# Patient Record
Sex: MALE | Race: WHITE | NOT HISPANIC OR LATINO | Employment: FULL TIME | URBAN - METROPOLITAN AREA
[De-identification: names, ages, dates, MRNs, and addresses within clinical notes are randomized per-mention and may not be internally consistent; named-entity substitution may affect disease eponyms.]

---

## 2023-06-28 ENCOUNTER — HOSPITAL ENCOUNTER (EMERGENCY)
Facility: MEDICAL CENTER | Age: 38
End: 2023-06-28
Attending: STUDENT IN AN ORGANIZED HEALTH CARE EDUCATION/TRAINING PROGRAM
Payer: COMMERCIAL

## 2023-06-28 ENCOUNTER — APPOINTMENT (OUTPATIENT)
Dept: RADIOLOGY | Facility: MEDICAL CENTER | Age: 38
End: 2023-06-28
Attending: STUDENT IN AN ORGANIZED HEALTH CARE EDUCATION/TRAINING PROGRAM
Payer: COMMERCIAL

## 2023-06-28 VITALS
HEART RATE: 75 BPM | BODY MASS INDEX: 20.14 KG/M2 | OXYGEN SATURATION: 96 % | TEMPERATURE: 98 F | RESPIRATION RATE: 20 BRPM | HEIGHT: 74 IN | DIASTOLIC BLOOD PRESSURE: 83 MMHG | SYSTOLIC BLOOD PRESSURE: 127 MMHG | WEIGHT: 156.97 LBS

## 2023-06-28 DIAGNOSIS — I26.94 MULTIPLE SUBSEGMENTAL PULMONARY EMBOLI WITHOUT ACUTE COR PULMONALE (HCC): ICD-10-CM

## 2023-06-28 LAB
ALBUMIN SERPL BCP-MCNC: 4 G/DL (ref 3.2–4.9)
ALBUMIN/GLOB SERPL: 1.3 G/DL
ALP SERPL-CCNC: 55 U/L (ref 30–99)
ALT SERPL-CCNC: 10 U/L (ref 2–50)
ANION GAP SERPL CALC-SCNC: 13 MMOL/L (ref 7–16)
APTT PPP: 29.4 SEC (ref 24.7–36)
AST SERPL-CCNC: 11 U/L (ref 12–45)
BASOPHILS # BLD AUTO: 0.4 % (ref 0–1.8)
BASOPHILS # BLD: 0.03 K/UL (ref 0–0.12)
BILIRUB SERPL-MCNC: 1.2 MG/DL (ref 0.1–1.5)
BUN SERPL-MCNC: 14 MG/DL (ref 8–22)
CALCIUM ALBUM COR SERPL-MCNC: 8.9 MG/DL (ref 8.5–10.5)
CALCIUM SERPL-MCNC: 8.9 MG/DL (ref 8.4–10.2)
CHLORIDE SERPL-SCNC: 105 MMOL/L (ref 96–112)
CO2 SERPL-SCNC: 22 MMOL/L (ref 20–33)
CREAT SERPL-MCNC: 0.95 MG/DL (ref 0.5–1.4)
D DIMER PPP IA.FEU-MCNC: 6.94 UG/ML (FEU) (ref 0–0.5)
EKG IMPRESSION: NORMAL
EOSINOPHIL # BLD AUTO: 0.3 K/UL (ref 0–0.51)
EOSINOPHIL NFR BLD: 4.1 % (ref 0–6.9)
ERYTHROCYTE [DISTWIDTH] IN BLOOD BY AUTOMATED COUNT: 40 FL (ref 35.9–50)
GFR SERPLBLD CREATININE-BSD FMLA CKD-EPI: 106 ML/MIN/1.73 M 2
GLOBULIN SER CALC-MCNC: 3.1 G/DL (ref 1.9–3.5)
GLUCOSE SERPL-MCNC: 103 MG/DL (ref 65–99)
HCT VFR BLD AUTO: 44.2 % (ref 42–52)
HGB BLD-MCNC: 15 G/DL (ref 14–18)
IMM GRANULOCYTES # BLD AUTO: 0.03 K/UL (ref 0–0.11)
IMM GRANULOCYTES NFR BLD AUTO: 0.4 % (ref 0–0.9)
INR PPP: 1.11 (ref 0.87–1.13)
LYMPHOCYTES # BLD AUTO: 1.5 K/UL (ref 1–4.8)
LYMPHOCYTES NFR BLD: 20.7 % (ref 22–41)
MCH RBC QN AUTO: 29.8 PG (ref 27–33)
MCHC RBC AUTO-ENTMCNC: 33.9 G/DL (ref 32.3–36.5)
MCV RBC AUTO: 87.7 FL (ref 81.4–97.8)
MONOCYTES # BLD AUTO: 0.63 K/UL (ref 0–0.85)
MONOCYTES NFR BLD AUTO: 8.7 % (ref 0–13.4)
NEUTROPHILS # BLD AUTO: 4.77 K/UL (ref 1.82–7.42)
NEUTROPHILS NFR BLD: 65.7 % (ref 44–72)
NRBC # BLD AUTO: 0 K/UL
NRBC BLD-RTO: 0 /100 WBC (ref 0–0.2)
NT-PROBNP SERPL IA-MCNC: 52 PG/ML (ref 0–125)
PLATELET # BLD AUTO: 184 K/UL (ref 164–446)
PMV BLD AUTO: 9.4 FL (ref 9–12.9)
POTASSIUM SERPL-SCNC: 3.8 MMOL/L (ref 3.6–5.5)
PROT SERPL-MCNC: 7.1 G/DL (ref 6–8.2)
PROTHROMBIN TIME: 14.2 SEC (ref 12–14.6)
RBC # BLD AUTO: 5.04 M/UL (ref 4.7–6.1)
SODIUM SERPL-SCNC: 140 MMOL/L (ref 135–145)
TROPONIN T SERPL-MCNC: <6 NG/L (ref 6–19)
WBC # BLD AUTO: 7.3 K/UL (ref 4.8–10.8)

## 2023-06-28 PROCEDURE — 99285 EMERGENCY DEPT VISIT HI MDM: CPT

## 2023-06-28 PROCEDURE — 85025 COMPLETE CBC W/AUTO DIFF WBC: CPT

## 2023-06-28 PROCEDURE — 71275 CT ANGIOGRAPHY CHEST: CPT

## 2023-06-28 PROCEDURE — 700117 HCHG RX CONTRAST REV CODE 255: Performed by: STUDENT IN AN ORGANIZED HEALTH CARE EDUCATION/TRAINING PROGRAM

## 2023-06-28 PROCEDURE — 85610 PROTHROMBIN TIME: CPT

## 2023-06-28 PROCEDURE — 80053 COMPREHEN METABOLIC PANEL: CPT

## 2023-06-28 PROCEDURE — 700102 HCHG RX REV CODE 250 W/ 637 OVERRIDE(OP): Performed by: STUDENT IN AN ORGANIZED HEALTH CARE EDUCATION/TRAINING PROGRAM

## 2023-06-28 PROCEDURE — 85306 CLOT INHIBIT PROT S FREE: CPT

## 2023-06-28 PROCEDURE — 85300 ANTITHROMBIN III ACTIVITY: CPT

## 2023-06-28 PROCEDURE — 85303 CLOT INHIBIT PROT C ACTIVITY: CPT

## 2023-06-28 PROCEDURE — 85379 FIBRIN DEGRADATION QUANT: CPT

## 2023-06-28 PROCEDURE — 85730 THROMBOPLASTIN TIME PARTIAL: CPT

## 2023-06-28 PROCEDURE — 83880 ASSAY OF NATRIURETIC PEPTIDE: CPT

## 2023-06-28 PROCEDURE — A9270 NON-COVERED ITEM OR SERVICE: HCPCS | Performed by: STUDENT IN AN ORGANIZED HEALTH CARE EDUCATION/TRAINING PROGRAM

## 2023-06-28 PROCEDURE — 36415 COLL VENOUS BLD VENIPUNCTURE: CPT

## 2023-06-28 PROCEDURE — 84484 ASSAY OF TROPONIN QUANT: CPT

## 2023-06-28 PROCEDURE — 85301 ANTITHROMBIN III ANTIGEN: CPT

## 2023-06-28 PROCEDURE — 85613 RUSSELL VIPER VENOM DILUTED: CPT

## 2023-06-28 PROCEDURE — 93005 ELECTROCARDIOGRAM TRACING: CPT | Performed by: STUDENT IN AN ORGANIZED HEALTH CARE EDUCATION/TRAINING PROGRAM

## 2023-06-28 PROCEDURE — 71045 X-RAY EXAM CHEST 1 VIEW: CPT

## 2023-06-28 PROCEDURE — 81241 F5 GENE: CPT

## 2023-06-28 RX ADMIN — RIVAROXABAN 15 MG: 15 TABLET, FILM COATED ORAL at 20:17

## 2023-06-28 RX ADMIN — IOHEXOL 80 ML: 350 INJECTION, SOLUTION INTRAVENOUS at 19:09

## 2023-06-29 ENCOUNTER — HOSPITAL ENCOUNTER (EMERGENCY)
Facility: MEDICAL CENTER | Age: 38
End: 2023-06-29
Attending: EMERGENCY MEDICINE
Payer: COMMERCIAL

## 2023-06-29 ENCOUNTER — APPOINTMENT (OUTPATIENT)
Dept: RADIOLOGY | Facility: MEDICAL CENTER | Age: 38
End: 2023-06-29
Attending: EMERGENCY MEDICINE
Payer: COMMERCIAL

## 2023-06-29 VITALS
SYSTOLIC BLOOD PRESSURE: 126 MMHG | RESPIRATION RATE: 16 BRPM | TEMPERATURE: 98.2 F | HEART RATE: 86 BPM | WEIGHT: 154.32 LBS | HEIGHT: 73 IN | BODY MASS INDEX: 20.45 KG/M2 | DIASTOLIC BLOOD PRESSURE: 75 MMHG | OXYGEN SATURATION: 95 %

## 2023-06-29 DIAGNOSIS — I26.94 MULTIPLE SUBSEGMENTAL PULMONARY EMBOLI WITHOUT ACUTE COR PULMONALE (HCC): ICD-10-CM

## 2023-06-29 DIAGNOSIS — Z79.01 CHRONIC ANTICOAGULATION: ICD-10-CM

## 2023-06-29 DIAGNOSIS — I82.4Z2 LOWER LEG DVT (DEEP VENOUS THROMBOEMBOLISM), ACUTE, LEFT (HCC): ICD-10-CM

## 2023-06-29 PROCEDURE — A9270 NON-COVERED ITEM OR SERVICE: HCPCS | Performed by: STUDENT IN AN ORGANIZED HEALTH CARE EDUCATION/TRAINING PROGRAM

## 2023-06-29 PROCEDURE — 700102 HCHG RX REV CODE 250 W/ 637 OVERRIDE(OP): Performed by: STUDENT IN AN ORGANIZED HEALTH CARE EDUCATION/TRAINING PROGRAM

## 2023-06-29 PROCEDURE — 99285 EMERGENCY DEPT VISIT HI MDM: CPT

## 2023-06-29 PROCEDURE — 93971 EXTREMITY STUDY: CPT | Mod: LT

## 2023-06-29 RX ORDER — OXYCODONE HYDROCHLORIDE AND ACETAMINOPHEN 5; 325 MG/1; MG/1
1 TABLET ORAL ONCE
Status: COMPLETED | OUTPATIENT
Start: 2023-06-29 | End: 2023-06-29

## 2023-06-29 RX ADMIN — OXYCODONE HYDROCHLORIDE AND ACETAMINOPHEN 1 TABLET: 5; 325 TABLET ORAL at 22:08

## 2023-06-29 ASSESSMENT — FIBROSIS 4 INDEX: FIB4 SCORE: 0.66

## 2023-06-29 NOTE — ED TRIAGE NOTES
Pt comes in w/ GF  c/o L L leg pain and swelling that started about 3 days ago   last night had sudden onset of 7-8/10 CP w/ SOB  no Hx of such   it did go away until this AM    sent here for further testing and evaluation of possible DVT/PE   did have a D-dimer drawn today and it was elevated   has been traveling a lot as of late either flying or and driving

## 2023-06-29 NOTE — ED PROVIDER NOTES
ED Provider Note    CHIEF COMPLAINT  Chief Complaint   Patient presents with    Leg Pain     Started about 3 days ago calf area      Chest Pain     Last night really bad    went aware   this morning CP back    Shortness of Breath     Hard time catching his breath          HPI/ROS  LIMITATION TO HISTORY   Select: : None    Mehrdad Kellogg is a 37 y.o. male who presents with left leg pain and swelling that started about 3 days ago, then last night developed chest pain, shortness of breath and some dizziness along with the symptoms.  Patient states his sister lives in town and is a physician (infectious disease) and put in labs and he had an elevated D-dimer of approximately 10 earlier today and was told to come here for further work-up and CT.  Patient states that shortness of breath and chest pain has significantly improved over last night, but he does still report slight shortness of breath especially when walking.  He denies any dizziness at this time and reports only mild chest discomfort now.  He states he is otherwise healthy, does not use tobacco, intermittently smokes marijuana.  He denies any hemoptysis.  Denies any history of DVT or PE.  States his mother has a history of 2 miscarriages.  No known family history of bleeding or clotting disorders.  He is not currently on any medications.  Patient states he does travel a lot and flew back and forth from [place] last week for a wedding.  He also visits his significant other in the [place] area frequently and travels and drives for work.    PAST MEDICAL HISTORY  No past medical history on file.     SURGICAL HISTORY  History reviewed. No pertinent surgical history.     FAMILY HISTORY  History reviewed. No pertinent family history.    SOCIAL HISTORY       CURRENT MEDICATIONS  Home Medications    Medication Sig Taking? Last Dose Authorizing Provider   rivaroxaban (XARELTO) 15 MG Tab tablet Take 1 Tablet by mouth 2 times a day for 21 days. Yes  Cary LOPEZ  "VEE Oro       ALLERGIES  No Known Allergies    PHYSICAL EXAM  /83   Pulse 75   Temp 36.7 °C (98 °F) (Temporal)   Resp 20   Ht 1.867 m (6' 1.5\")   Wt 71.2 kg (156 lb 15.5 oz)   SpO2 96%   Constitutional: Alert in no apparent distress.  HENT: No signs of trauma, Bilateral external ears normal, Nose normal.   Eyes: Pupils are equal and reactive, Conjunctiva normal, Non-icteric.   Neck: Normal range of motion, Supple, No stridor.   Cardiovascular: Regular rate and rhythm, no murmurs.   Thorax & Lungs: Normal breath sounds, No respiratory distress, No wheezing  Skin: Warm, Dry, No erythema, No rash.   Extremities: Intact distal pulses, mild tenderness of left lower calf, mild swelling of left lower calf leg compared to right   Neurologic: Alert , Normal motor function, Normal speech, No focal deficits noted.   Psychiatric: Affect normal, Judgment normal, Mood normal.         DIAGNOSTIC STUDIES / PROCEDURES    LABS & EKG  I have independently interpreted this EKG     Results for orders placed or performed during the hospital encounter of 06/28/23   CBC with Differential   Result Value Ref Range    WBC 7.3 4.8 - 10.8 K/uL    RBC 5.04 4.70 - 6.10 M/uL    Hemoglobin 15.0 14.0 - 18.0 g/dL    Hematocrit 44.2 42.0 - 52.0 %    MCV 87.7 81.4 - 97.8 fL    MCH 29.8 27.0 - 33.0 pg    MCHC 33.9 32.3 - 36.5 g/dL    RDW 40.0 35.9 - 50.0 fL    Platelet Count 184 164 - 446 K/uL    MPV 9.4 9.0 - 12.9 fL    Neutrophils-Polys 65.70 44.00 - 72.00 %    Lymphocytes 20.70 (L) 22.00 - 41.00 %    Monocytes 8.70 0.00 - 13.40 %    Eosinophils 4.10 0.00 - 6.90 %    Basophils 0.40 0.00 - 1.80 %    Immature Granulocytes 0.40 0.00 - 0.90 %    Nucleated RBC 0.00 0.00 - 0.20 /100 WBC    Neutrophils (Absolute) 4.77 1.82 - 7.42 K/uL    Lymphs (Absolute) 1.50 1.00 - 4.80 K/uL    Monos (Absolute) 0.63 0.00 - 0.85 K/uL    Eos (Absolute) 0.30 0.00 - 0.51 K/uL    Baso (Absolute) 0.03 0.00 - 0.12 K/uL    Immature Granulocytes (abs) 0.03 0.00 - " 0.11 K/uL    NRBC (Absolute) 0.00 K/uL   Comp Metabolic Panel   Result Value Ref Range    Sodium 140 135 - 145 mmol/L    Potassium 3.8 3.6 - 5.5 mmol/L    Chloride 105 96 - 112 mmol/L    Co2 22 20 - 33 mmol/L    Anion Gap 13.0 7.0 - 16.0    Glucose 103 (H) 65 - 99 mg/dL    Bun 14 8 - 22 mg/dL    Creatinine 0.95 0.50 - 1.40 mg/dL    Calcium 8.9 8.4 - 10.2 mg/dL    AST(SGOT) 11 (L) 12 - 45 U/L    ALT(SGPT) 10 2 - 50 U/L    Alkaline Phosphatase 55 30 - 99 U/L    Total Bilirubin 1.2 0.1 - 1.5 mg/dL    Albumin 4.0 3.2 - 4.9 g/dL    Total Protein 7.1 6.0 - 8.2 g/dL    Globulin 3.1 1.9 - 3.5 g/dL    A-G Ratio 1.3 g/dL   TROPONIN   Result Value Ref Range    Troponin T <6 6 - 19 ng/L   Prothrombin Time   Result Value Ref Range    PT 14.2 12.0 - 14.6 sec    INR 1.11 0.87 - 1.13   APTT   Result Value Ref Range    APTT 29.4 24.7 - 36.0 sec   D-DIMER   Result Value Ref Range    D-Dimer 6.94 (H) 0.00 - 0.50 ug/mL (FEU)   proBrain Natriuretic Peptide, NT   Result Value Ref Range    NT-proBNP 52 0 - 125 pg/mL   CORRECTED CALCIUM   Result Value Ref Range    Correct Calcium 8.9 8.5 - 10.5 mg/dL   ESTIMATED GFR   Result Value Ref Range    GFR (CKD-EPI) 106 >60 mL/min/1.73 m 2   EKG   Result Value Ref Range    Report       [facility] Emergency Dept.    Test Date:  2023  Pt Name:    RONNI CHILEL             Department: [facility]  MRN:        [id number]                      Room:       -ROOM 6  Gender:     Male                         Technician: [id number] LINDA  :        1985                   Requested By:ER TRIAGE PROTOCOL  Order #:    [id number]                    Reading MD: Cary Oro    Measurements  Intervals                                Axis  Rate:       85                           P:          81  IL:         129                          QRS:        57  QRSD:       102                          T:          54  QT:         362  QTc:        431    Interpretive Statements  Sinus rhythm rate of  85, normal axis, normal intervals, no ST elevations,  depressions, or T wave inversions no signs of right heart strain  Electronically Signed On 06- 19:24:51 PDT by Cary Oro         RADIOLOGY  I have independently interpreted the diagnostic imaging associated with this visit and am waiting the final reading from the radiologist.   My preliminary interpretation is a follows: 1 view chest x-ray with no pneumothorax, no cardiomegaly, possible slight hazy infiltrate in the right lower lobe    Radiologist interpretation:   CT-CTA CHEST PULMONARY ARTERY W/ RECONS   Final Result      1.  Positive for pulmonary embolism located within right lower lobe segmental and subsegmental branches and left segmental branches      2.  No evidence for right heart strain      3.  Bilateral lower lobe pulmonary infarct            DX-CHEST-PORTABLE (1 VIEW)   Final Result      Negative single view of the chest.          COURSE & MEDICAL DECISION MAKING    ED Observation Status? Yes; I am placing the patient in to an observation status due to a diagnostic uncertainty as well as therapeutic intensity. Patient placed in observation status at 6:46 PM, 6/28/2023.     Observation plan is as follows: DVT/PE workup-- labs, CTA, US DVT    Upon Reevaluation, the patient's condition has: Improved; and will be discharged.    Patient discharged from ED Observation status at 8:04 PM (Time) 06/28/23 (Date).     INITIAL ASSESSMENT, COURSE AND PLAN  Care Narrative: 37-year-old previously healthy male presenting with complaint of chest pain, shortness of breath following several days of left lower extremity swelling now with positive D-dimer reported outpatient.  History is concerning for possible PE.  Patient is hemodynamically stable here, not hypoxic or tachycardic and has no clear identifiable risk factors for clotting apart from recent travel.  Given elevated D-dimer outpatient, symptoms will obtain labs including troponin, proBNP as well  as EKG, ultrasound of the left lower extremity to evaluate for DVT and CTA to evaluate for PE.    7:25 PM  Patient with positive PE with bilateral lower lobe infarcts on CT per radiology.  No evidence of right heart strain on CT, but bilateral lower lobe pulmonary infarcts are present.  Canceled venous duplex as patient will require anticoagulation due to his PEs, duplex no longer needed.    7:58 PM  Patient's labs are reassuring with a normal troponin, normal proBNP, normal renal function, and no anemia.  D-dimer is elevated as expected.  Patient's PESI score is 45 which places him at class I which is very low risk less than 1.6% 30-day mortality.  I discussed this with patient and his partner.  They are agreeable with outpatient anticoagulation.  We will start him on Xarelto with first dose given here.  Patient does endorse an uncle that he just found out had a blood clot and was found to have positive lupus anticoagulant.  Anticoagulation labs ordered to assist in outpatient follow-up prior to discharge.        ADDITIONAL PROBLEM LIST    Acute bilateral subsegmental pulmonary embolisms      DISPOSITION AND DISCUSSIONS      Escalation of care considered, and ultimately not performed:acute inpatient care management, however at this time, the patient is most appropriate for outpatient management    Barriers to care at this time, including but not limited to: Patient does not have established PCP.     Decision tools and prescription drugs considered including, but not limited to:  PESI Score; Anticoagulation-- Xarelto .    Discharged home in stable condition    FINAL DIAGNOSIS  1. Multiple subsegmental pulmonary emboli without acute cor pulmonale (HCC) Acute Referral to Anticoagulation Monitoring    rivaroxaban (XARELTO) 15 MG Tab tablet    Referral to establish with [facility] PCP          CRITICAL CARE  The very real possibilty of a deterioration of this patient's condition required the highest level of my preparedness  for sudden, emergent intervention.  I provided critical care services, which included medication orders, frequent reevaluations of the patient's condition and response to treatment, ordering and reviewing test results, and discussing the case with various consultants.  The critical care time associated with the care of the patient was 31 minutes. Review chart for interventions. This time is exclusive of any other billable procedures.     Electronically signed by: Cary Oro M.D., 06/28/23 6:41 PM

## 2023-06-29 NOTE — ED NOTES
Dc instructions and medications discussed with patient at bedside. All questions answered at this time. VSS. No IV in place at this time. Pt to lobby without incident. family to drive patient home.   Pt instructed to follow up with anticoagulation clinic and establish a new pcp.

## 2023-06-29 NOTE — DISCHARGE INSTRUCTIONS
As we discussed start the blood thinners we have prescribed you to help treat your blood clot in your lungs and presumably the blood clot in your leg as well.    As we discussed, return to the emergency department if you develop worsening chest pain worsening shortness of breath, you pass out, or any other concerns.    You will need to follow-up with the anticoagulation monitoring clinic outpatient for additional prescriptions and continuation of the blood thinners.  We also strongly recommend you get a primary care doctor and placed a referral for a primary care doctor as well.

## 2023-06-30 NOTE — ED TRIAGE NOTES
Pt amb to triage.  Chief Complaint   Patient presents with    Leg Pain     Left LE     Dx w/ bilat PE last night, started xerelto. Now c/o worsening LLE pain.

## 2023-06-30 NOTE — ED NOTES
Patient just took 500 mg of Tylenol; updated him about the embolic stockings. Informed him to call me if he still needs the Oxycodone and will given him before he leaves the hospital

## 2023-06-30 NOTE — ED NOTES
Patient states they can't no longer wait for the stockings and will just buy tomorrow. Due pain maid given per MAR

## 2023-06-30 NOTE — DISCHARGE SUMMARY
"  ED Observation Discharge Summary    Patient:Mehrdad Kellogg  Patient : 1985  Patient MRN: [id number]  Patient PCP: Pcp Pt States None    Admit Date: 2023  Discharge Date and Time: 23 7:42 PM  Discharge Diagnosis: DVT left leg, PE  Discharge Attending: Cary Oro M.D.  Discharge Service: ED Observation    ED Course  Mehrdad is a 37 y.o. male who was evaluated at [facility] yesterday and found to have acute PE, suspected to have DVT in the left lower extremity but as anticoagulation was being started for the PE and patient very low risk with a very low PESI score no ultrasound of the left lower extremity was performed.  Patient returned today with worsening pain and concern for possibly needing catheter directed thrombolysis or further intervention if he has a DVT.    Received in signout pending US for DVT as they have requested this done as has several consultant friends at other facilities. No change in management anticipated at this time as patient does not meet criteria for any change in management or interventional radiology. PESI score remains 45-- very low risk.     8:52 PM  Spoke with Dr. De La Torre-- vascular surgery. Does not meet criteria with his current clot burden for any intervention including clot retrieval or clot directed thrombolysis.  Recommends stay on Xarelto, elevate leg, lightweight compression stocking on leg, and continuing gentle mobility and activity at home to improve circulation.     9:14 PM  I have updated Dr. Mallory (ICU) with patient's permission as he has been in touch with Dr. Garcia who has previously worked with patient's sister and whom has been in contact with Dr. Mallory regarding patient condition.  Patient agreeable with this discussion.  I have updated him and his partner on the results, plan and they are agreeable with this.    Discharge Exam:  /75   Pulse 86   Temp 36.8 °C (98.2 °F) (Temporal)   Resp 16   Ht 1.854 m (6' 1\")   Wt 70 kg " (154 lb 5.2 oz)   SpO2 95%   BMI 20.36 kg/m² .    Constitutional: Awake and alert. Nontoxic  HENT:  Grossly normal  Eyes: Grossly normal  Neck: Normal range of motion  Cardiovascular: Normal heart rate   Thorax & Lungs: No respiratory distress  Abdomen: Nontender  Skin:  No pathologic rash.   Extremities: Well perfused, mild tenderness of left calf  Psychiatric: Affect normal      Radiology  US-EXTREMITY VENOUS LOWER UNILAT LEFT   Final Result          Medications:   Discharge Medication List as of 6/29/2023 10:10 PM          My final assessment includes Acute LLE DVT, Acute PE    Upon Reevaluation, the patient's condition has: Improved; and will be discharged.    Patient discharged from ED Observation status at 9:16 PM (Time) 06/29/23 (Date).     Total time spent on this ED Observation discharge encounter is > 30 Minutes    Electronically signed by: Cary Oro M.D., 6/29/2023 7:42 PM

## 2023-06-30 NOTE — ED NOTES
Bedside report received from Jorge Luis QURESHI; patient awaiting for R leg US. Assumed patient care. Verified patient identification. Checked on bed, connected to monitor,  with unlabored respirations. Discussed plan of care. Vital signs is stable. Denied any new complaints. [place]ney in low position, side rail up for pt safety. Call light within reach. Will continue to monitor for any complications.

## 2023-06-30 NOTE — ED PROVIDER NOTES
"ER Provider Note    Scribed for Aren Alvarado M.D. by Scarlet Clements. 6/29/2023   5:58 PM    Primary Care Provider: Pcp Pt States None    CHIEF COMPLAINT  Chief Complaint   Patient presents with    Leg Pain     Left LE     EXTERNAL RECORDS REVIEWED  Patient was seen at the [facility] ED last night for left lower extremity pain and diagnosed with bilateral PE.     HPI/ROS  LIMITATION TO HISTORY   Select: : None  OUTSIDE HISTORIAN(S):  None    Mehrdad Kellogg is a 37 y.o. male who presents to the ED for evaluation of left lower extremity pain onset 4 days ago. The patient states he was seen at [facility] last night, where he was diagnosed with bilateral pulmonary embolism and started on Xarelto. His left lower extremity pain has worsened since, prompting him to present to the ED. He denies any family history of blood clots. No alleviating or exacerbating factors were noted. He adds that he travels frequently and works as a , where he is continuously seated.    PAST MEDICAL HISTORY  Bilateral PE    SURGICAL HISTORY  No past surgical history noted.    FAMILY HISTORY  No family history noted.    SOCIAL HISTORY   reports that he has never smoked. He has never used smokeless tobacco. He reports current alcohol use. He reports current drug use. Drug: Inhaled.    CURRENT MEDICATIONS  Previous Medications    RIVAROXABAN (XARELTO) 15 MG TAB TABLET    Take 1 Tablet by mouth 2 times a day for 21 days.     ALLERGIES  No Known Allergies     PHYSICAL EXAM  /86   Pulse 93   Temp 36.6 °C (97.8 °F) (Temporal)   Resp 18   Ht 1.854 m (6' 1\")   Wt 70 kg (154 lb 5.2 oz)   SpO2 97%   BMI 20.36 kg/m²    Nursing note and vitals reviewed.  Constitutional: Well-developed and well-nourished. No distress.   HENT: Head is normocephalic and atraumatic. Oropharynx is clear and moist without exudate or erythema.   Eyes: Pupils are equal, round, and reactive to light. Conjunctiva are " normal.   Cardiovascular: Normal rate and regular rhythm. No murmur heard. Normal radial pulses.  Pulmonary/Chest: Breath sounds normal. No wheezes or rales.   Abdominal: Soft and non-tender. No distention    Musculoskeletal: Tenderness over medial aspect of the left calf.  Neurological: Awake, alert and oriented to person, place, and time. No focal deficits noted.  Skin: Skin is warm and dry. No rash.   Psychiatric: Normal mood and affect. Appropriate for clinical situation    DIAGNOSTIC STUDIES    Radiology:   This attending emergency physician has independently interpreted the diagnostic imaging associated with this visit and is awaiting the final reading from the radiologist.   Preliminary interpretation is a follows: Pending    Radiologist interpretation:   US-EXTREMITY VENOUS LOWER UNILAT LEFT    (Results Pending)      INITIAL ASSESSMENT AND PLAN    5:58 PM - Patient was evaluated at bedside. Informed patient that, given his previous workup, I do not believe further diagnostic studies are necessary. Upon discussion with patient, he would still like to receive an US to evaluate the clot burden. Informed patient that I will order one. Recommended that patient continue using Xarelto as prescribed. Patient verbalizes understanding and support with my plan of care. Ordered US-Extremity Venous Lower Unilat Left to evaluate.     ED Observation Status? Yes.    My partner Dr. Oro will follow-up on the results of the ultrasound.  However I do not feel that the ultrasound results will likely change therapy.  Patient is currently anticoagulated.  They have insisted on obtaining the ultrasound.  These results will be forwarded to the patient's physician family members.     FINAL DIANGOSIS  1. Pain of left lower extremity    2. Multiple subsegmental pulmonary emboli without acute cor pulmonale (HCC)    3. Chronic anticoagulation        IScarlet), am scribing for, and in the presence of, Aren TAI  VEE Alvarado.    Electronically signed by: Scarlet Clements (Scribe), 6/29/2023    IAren M.D. personally performed the services described in this documentation, as scribed by Scarlet Clements in my presence, and it is both accurate and complete.      The note accurately reflects work and decisions made by me.  Aren Alvarado M.D.  6/29/2023  8:28 PM

## 2023-06-30 NOTE — DISCHARGE INSTRUCTIONS
As we discussed, continue to take your Xarelto as prescribed.  Follow-up with the anticoagulation clinic.    When resting, keep your leg elevated and try to use a compressive stocking to help improve blood flow.  It is good for you to still remain active and mobile however and walk around.  The pain should start to improve as the clot dissolves.      Return to the emergency department if you develop worsening chest pain, shortness of breath, you pass out, or other concerns.

## 2023-07-01 LAB
APTT IMM NP PPP: NORMAL SEC (ref 32–48)
AT III ACT/NOR PPP CHRO: 108 % (ref 76–128)
AT III AG ACT/NOR PPP IA: 96 % (ref 82–136)
CONFIRM APTT STACLOT: NORMAL
DRVVT SCREEN TO CONFIRM RATIO: NORMAL RATIO
LA 3 SCREEN W REFLEX-IMP: NORMAL
LA NT PLATELET PPP: NORMAL S
MIXING DRVVT: NORMAL SEC (ref 33–44)
PROT C ACT/NOR PPP: 129 % (ref 83–168)
PROT S ACT/NOR PPP: 74 % (ref 66–143)
PROTHROMBIN TIME: 14.4 SEC (ref 12–15.5)
PT P HEP NEUT PPP: NORMAL SEC (ref 32–48)
REPTILASE TIME: NORMAL SEC
SCREEN APTT: 48 SEC (ref 32–48)
SCREEN DRVVT: 38 SEC (ref 33–44)
THROMBIN TIME: NORMAL SEC (ref 14.7–19.5)

## 2023-07-03 LAB — F5 P.R506Q BLD/T QL: NEGATIVE

## 2023-07-06 ENCOUNTER — DOCUMENTATION (OUTPATIENT)
Dept: VASCULAR LAB | Facility: MEDICAL CENTER | Age: 38
End: 2023-07-06

## 2023-07-06 ENCOUNTER — ANTICOAGULATION VISIT (OUTPATIENT)
Dept: VASCULAR LAB | Facility: MEDICAL CENTER | Age: 38
End: 2023-07-06
Attending: INTERNAL MEDICINE
Payer: COMMERCIAL

## 2023-07-06 DIAGNOSIS — I26.99 ACUTE PULMONARY EMBOLISM, UNSPECIFIED PULMONARY EMBOLISM TYPE, UNSPECIFIED WHETHER ACUTE COR PULMONALE PRESENT (HCC): ICD-10-CM

## 2023-07-06 DIAGNOSIS — Z86.718 HISTORY OF DVT IN ADULTHOOD: ICD-10-CM

## 2023-07-06 PROBLEM — I82.409 DEEP VEIN THROMBOSIS (HCC): Status: ACTIVE | Noted: 2023-07-06

## 2023-07-06 PROCEDURE — 99213 OFFICE O/P EST LOW 20 MIN: CPT

## 2023-07-06 NOTE — PROGRESS NOTES
NEW DOAC   .  Anticoagulation Summary  As of 7/6/2023      INR goal:     TTR:  --   INR used for dosing:  No new INR was available at the time of this encounter.   Warfarin maintenance plan:  No maintenance plan   Next INR check:  8/4/2023   Target end date:  1/6/2024    Indications    Deep vein thrombosis (HCC) [I82.409]  Pulmonary embolism (HCC) [I26.99]                 Anticoagulation Episode Summary       INR check location:      Preferred lab:      Send INR reminders to:      Comments:  Xarelto          Anticoagulation Care Providers       Provider Role Specialty Phone number    [facility]   [phone]          Anticoagulation Patient Findings      PCP: Pcp Pt States None  Cardiologist: None  Dx: Acute LLE DVT and bilateral PE  CHADSVASC = n/a  HAS-BLED = 0  Target End Date = 1/6/24    Pt Hx: Pt was seen in the ER for LLE pain and SOB/chest pain. He was found to have LLE DVT and bilateral PE. This is pt's first VTE event. There are no obvious provoking factors besides a 6 hr flight to [place]. He states his uncle had a blood clot and was positive for Lupus anticoagulant, however pt's hypercoag labs were WNL. He states in 2019 he had 2 panic attacks where he felt similar symptoms to the PE, but he never followed up with anyone to r/o PE at the time.    Labs:  Lab Results   Component Value Date/Time    WBC 7.3 06/28/2023 06:35 PM    RBC 5.04 06/28/2023 06:35 PM    HEMOGLOBIN 15.0 06/28/2023 06:35 PM    HEMATOCRIT 44.2 06/28/2023 06:35 PM    MCV 87.7 06/28/2023 06:35 PM    MCH 29.8 06/28/2023 06:35 PM    MCHC 33.9 06/28/2023 06:35 PM    MPV 9.4 06/28/2023 06:35 PM    NEUTSPOLYS 65.70 06/28/2023 06:35 PM    LYMPHOCYTES 20.70 (L) 06/28/2023 06:35 PM    MONOCYTES 8.70 06/28/2023 06:35 PM    EOSINOPHILS 4.10 06/28/2023 06:35 PM    BASOPHILS 0.40 06/28/2023 06:35 PM      Lab Results   Component Value Date/Time    SODIUM 140 06/28/2023 06:35 PM    POTASSIUM 3.8 06/28/2023 06:35 PM    CHLORIDE 105  06/28/2023 06:35 PM    CO2 22 06/28/2023 06:35 PM    GLUCOSE 103 (H) 06/28/2023 06:35 PM    BUN 14 06/28/2023 06:35 PM    CREATININE 0.95 06/28/2023 06:35 PM          Pt is new to Xarelto and new to [facility]. Discussed:   Indication for DOAC therapy.  Importance of monitoring and compliance.   Monitoring parameters, signs and symptoms of bleeding or clotting.  DOAC therapy, side effects, potential DDIs, OTC medications  Lifestyle safety, ie smoking, ETOH, hobby safety, fall safety/prevention  Procedures for missed doses or suspected missed doses, surgeries/procedures, travel, dental work, any medication changes     Start with Xarelto 15mg taken 2 times a day for 21 days and then change to 20mg taken once daily. Pt will transition to 20mg dose on 7/19/23    DOAC affordable = yes    F/U - 4 weeks    Marion Martin, PharmD      Added [facility] to care team   Send to [facility] Consent                                             Name    Mehrdad Kellogg    MRN number: [id number]    the following are guidelines for participation in the [facility].     I, ____Mehrdad An Charmainecale_____, understand and voluntarily agree to participate in the [facility] and to have services provided to me by pharmacists working in collaboration with my provider.    I understand the pharmacist within the [facility] may initiate, modify or discontinue my medications, order appropriate testing and appointments, perform exams, monitor treatment, and make clinical evaluations and decisions pursuant to a collaborative practice agreement with my provider.  I understand the pharmacist within the [facility] is not a physician, osteopathic physician, advanced practice registered nurse or physician assistant and may not diagnose.  I will take all my medications as instructed and not  change the way I take it without first talking to my provider or a pharmacist within the [facility].  I understand that if I am late to my appointment I may not be able to be seen by a pharmacist at that time and will have to reschedule my appointment.  During appointment with pharmacist I understand that pharmacist has the right not to answer questions or perform services outside the pharmacist’s scope of practice.  By signing below, I provide informed consent for the pharmacist to provide these services and for my participation in the [facility].      Mehrdad Kellogg           [id number]          07/06/23  Patient Name                   MRN number  Date     ____Obtained verbal consent from Mehrdad Kellogg

## 2023-07-06 NOTE — PROGRESS NOTES
Initial anticoag note and most recent d/c summary reviewed.     Started on oac by inpt team for dvt/pe with at most modest provocation. Does have a fam history of vte.     Hypercoag w/u neg.     Will continue with at least 6 mo of oac and then have patient seen in vascular medicine to determine LOT and any further indicated w/u.    Michael Bloch, MD  Anticoagulation Clinic

## 2023-07-07 ENCOUNTER — TELEPHONE (OUTPATIENT)
Dept: HEALTH INFORMATION MANAGEMENT | Facility: OTHER | Age: 38
End: 2023-07-07

## 2023-08-04 ENCOUNTER — ANTICOAGULATION VISIT (OUTPATIENT)
Dept: VASCULAR LAB | Facility: MEDICAL CENTER | Age: 38
End: 2023-08-04
Attending: INTERNAL MEDICINE
Payer: COMMERCIAL

## 2023-08-04 VITALS — HEART RATE: 86 BPM | SYSTOLIC BLOOD PRESSURE: 136 MMHG | DIASTOLIC BLOOD PRESSURE: 85 MMHG

## 2023-08-04 DIAGNOSIS — Z79.01 CHRONIC ANTICOAGULATION: ICD-10-CM

## 2023-08-04 PROCEDURE — 99212 OFFICE O/P EST SF 10 MIN: CPT

## 2023-08-04 NOTE — PROGRESS NOTES
Cardiologist: None  Dx: Acute LLE DVT and bilateral PE  CHADSVASC = n/a  HAS-BLED = 0  Target End Date = 1/6/24    Health Status Since Last Assessment  Patient denies any new relevant medical problems, ED visits or hospitalizations  Patient denies any embolic events (stroke/tia/systemic embolism)    Adherence with DOAC Therapy  Pt denies missed any doses in the average week    Bleeding Risk Assessment    Denies Epistaxis  Pt denies any excessive or unusual bleeding/hematomas.  Pt denies any GI bleeds or hematemesis.  Pt denies any concerning daily headache or sub dural hematoma symptoms.    Pt denies any hematuria     Latest Hemoglobin    Latest Reference Range & Units 06/28/23 18:35   WBC 4.8 - 10.8 K/uL 7.3   RBC 4.70 - 6.10 M/uL 5.04   Hemoglobin 14.0 - 18.0 g/dL 15.0   Hematocrit 42.0 - 52.0 % 44.2   MCV 81.4 - 97.8 fL 87.7   MCH 27.0 - 33.0 pg 29.8   MCHC 32.3 - 36.5 g/dL 33.9   RDW 35.9 - 50.0 fL 40.0   Platelet Count 164 - 446 K/uL 184   MPV 9.0 - 12.9 fL 9.4     ETOH overuse: 1-2 drink per day       Creatinine Clearance/Renal Function    Latest ClCr 106 mL/min 06/28/2023    Hepatic function     Latest LFTs    Latest Reference Range & Units 06/28/23 18:35   AST(SGOT) 12 - 45 U/L 11 (L)   ALT(SGPT) 2 - 50 U/L 10   (L): Data is abnormally low   Pt denies any history of liver dysfunction      Drug Interactions  Platelets: 184 06/28/23     ASA/other antiplatelets: none  NSAID: none  Other drug interactions: none  Verified no anticonvulsant or azole therapy, education provided for future use.     Examination  Blood Pressure 136/85 mmHg  Symptomatic hypotension none  Significant gait impairment/imbalance/high risk for falls? none    Final Assessment and Recommendations:   Patient appears stable from the anticoagulation standpoint.     Benefits of continued DOAC therapy outweigh risks for this patient   Recommend pt continue with current DOAC therapy of Xarelto 20 mg daily    DOAC is affordable. Pt pays  $25/month    Follow up:   Will follow up with patient in 3 months.     Layla Mendoza, Pharm.D, BCACP, BC-ADM

## 2023-10-26 ENCOUNTER — OFFICE VISIT (OUTPATIENT)
Dept: VASCULAR LAB | Facility: MEDICAL CENTER | Age: 38
End: 2023-10-26
Attending: FAMILY MEDICINE
Payer: COMMERCIAL

## 2023-10-26 VITALS
BODY MASS INDEX: 22.26 KG/M2 | HEIGHT: 73 IN | SYSTOLIC BLOOD PRESSURE: 118 MMHG | HEART RATE: 75 BPM | WEIGHT: 168 LBS | DIASTOLIC BLOOD PRESSURE: 60 MMHG

## 2023-10-26 DIAGNOSIS — I26.99 PULMONARY INFARCTION (HCC): ICD-10-CM

## 2023-10-26 DIAGNOSIS — Z79.01 CHRONIC ANTICOAGULATION: ICD-10-CM

## 2023-10-26 DIAGNOSIS — I82.412 ACUTE DEEP VEIN THROMBOSIS (DVT) OF LEFT FEMORAL VEIN (HCC): ICD-10-CM

## 2023-10-26 DIAGNOSIS — I26.99 OTHER ACUTE PULMONARY EMBOLISM WITHOUT ACUTE COR PULMONALE (HCC): ICD-10-CM

## 2023-10-26 PROBLEM — R74.8 ABNORMAL LEVELS OF OTHER SERUM ENZYMES: Status: ACTIVE | Noted: 2019-04-21

## 2023-10-26 PROBLEM — R10.13 DYSPEPSIA: Status: ACTIVE | Noted: 2017-03-09

## 2023-10-26 PROBLEM — K21.9 GERD (GASTROESOPHAGEAL REFLUX DISEASE): Status: ACTIVE | Noted: 2017-08-21

## 2023-10-26 PROBLEM — N52.9 ERECTILE DYSFUNCTION: Status: ACTIVE | Noted: 2017-03-09

## 2023-10-26 PROCEDURE — 99212 OFFICE O/P EST SF 10 MIN: CPT

## 2023-10-26 PROCEDURE — 3074F SYST BP LT 130 MM HG: CPT | Performed by: FAMILY MEDICINE

## 2023-10-26 PROCEDURE — 3078F DIAST BP <80 MM HG: CPT | Performed by: FAMILY MEDICINE

## 2023-10-26 PROCEDURE — 99204 OFFICE O/P NEW MOD 45 MIN: CPT | Performed by: FAMILY MEDICINE

## 2023-10-26 RX ORDER — RIFAXIMIN 550 MG/1
TABLET ORAL
COMMUNITY
Start: 2023-08-24

## 2023-10-26 ASSESSMENT — ENCOUNTER SYMPTOMS
PALPITATIONS: 0
PND: 0
COUGH: 0
CHILLS: 0
HEMOPTYSIS: 0
FEVER: 0
BLOOD IN STOOL: 0
BRUISES/BLEEDS EASILY: 0
CLAUDICATION: 0
ORTHOPNEA: 0
SPUTUM PRODUCTION: 0
WHEEZING: 0
SHORTNESS OF BREATH: 0

## 2023-10-26 ASSESSMENT — FIBROSIS 4 INDEX: FIB4 SCORE: 0.68

## 2023-10-26 NOTE — PROGRESS NOTES
INITIAL VASCULAR ANTICOAGULATION VISIT  10/26/23     Mehrdad Kellogg is a 38 y.o. male who presents for  Chief Complaint   Patient presents with    Pulmonary Embolism     NP Dx: Acute pulmonary embolism, unspecified pulmonary embolism type, unspecified whether acute cor pulmonale present     Initially referred by Bloch, Michael J, M.D. for length of therapy (LOT) determination and management of anticoagulation in context of acute venothromboembolic disease, est 10/2023    Subjective      VTE disease / Anticoagulation:   Date of initiation of anticoagulation: 6/2023  Date of Diagnosis: same   Type of Venous thromboembolic disease (VTE): acute RL seg and subseg PE with infarcts   Initial visit hx/sx:  prior to dx, 3 days with acute LLE calf pain and swelling with onset of CP, SOB.  Deemed safe for o/p care after seen at [facility] ED per PESI = 45.  Seen next day for LLE calf pain and vasc surg advised does not meet criteria for thrombectomy.  Denies current SOB, CP, leg swelling, edema, leg pain, cyanosis of digits, redness of extremities.  Antithrombotic therapy at time of VTE event: none   Current antithrombotic agent: xarelto 20mg daily   VTE tx course: Xarelto 15mg BID x 21d, then 20mg daily to date    Complications: none, tolerating well, no bleeding or bruising   Adherence: reports complete, no missed doses    _____PROVOKING FACTORS:  Any personal VTE hx? No  Any family VTE hx? Yes, Details: pat uncle  with lupus AC+, hx of 1 episode of DVT  Recent surgery ? No  Recent trauma ? No  Smoker?  reports that he has never smoked. He has never used smokeless tobacco.    Extended travel? Yes, Details: [place] - travel for wedding  , limited movement during travel  Other periods of immobility? No  Other known or potential risk factors for VTE disease:  yes - COVID+ 12/2022  MEN:  Hx of cancer or abnormal cancer screenings: no  Hx of Testosterone therapy: no  Hypercoaguability work-up completed?  Yes - partial completed  "prior to OAC initiation - all negative     Patient Active Problem List   Diagnosis    Deep vein thrombosis (HCC)    Pulmonary embolism (HCC)    GERD (gastroesophageal reflux disease)    Erectile dysfunction    Dyspepsia    Abnormal levels of other serum enzymes    Pulmonary infarction (HCC)      History reviewed. No pertinent surgical history.    Current Outpatient Medications:     rivaroxaban, 20 mg, Oral, PM MEAL, Taking    Xifaxan, Take 1 tablet (550 mg) by mouth 3 times per day for 14 days     No Known Allergies    Family History   Problem Relation Age of Onset    Atrial fibrillation Mother     No Known Problems Father     No Known Problems Sister     DVT Maternal Uncle         lupus AC+    Heart Attack Neg Hx      Social History     Tobacco Use    Smoking status: Never    Smokeless tobacco: Never   Vaping Use    Vaping Use: Some days    Substances: THC   Substance Use Topics    Alcohol use: Yes     Comment: occ    Drug use: Yes     Types: Inhaled     Comment: william       DIET AND EXERCISE:  Weight Change:stable   BMI Readings from Last 5 Encounters:   10/26/23 22.16 kg/m²   06/29/23 20.36 kg/m²   06/28/23 20.43 kg/m²     Diet: common adult  Exercise: strenuous regular exercise program     Review of Systems   Constitutional:  Negative for chills, fever and malaise/fatigue.   HENT:  Negative for nosebleeds.    Respiratory:  Negative for cough, hemoptysis, sputum production, shortness of breath and wheezing.    Cardiovascular:  Negative for chest pain, palpitations, orthopnea, claudication, leg swelling and PND.   Gastrointestinal:  Negative for blood in stool.   Endo/Heme/Allergies:  Does not bruise/bleed easily.           Objective    Vitals:    10/26/23 1445   BP: 118/60   BP Location: Left arm   Patient Position: Sitting   BP Cuff Size: Adult   Pulse: 75   Weight: 76.2 kg (168 lb)   Height: 1.854 m (6' 1\")      BP Readings from Last 5 Encounters:   10/26/23 118/60   08/04/23 136/85   06/29/23 126/75   06/28/23 " 127/83      Body mass index is 22.16 kg/m².  Wt Readings from Last 3 Encounters:   10/26/23 76.2 kg (168 lb)   06/29/23 70 kg (154 lb 5.2 oz)   06/28/23 71.2 kg (156 lb 15.5 oz)      Physical Exam  Vitals reviewed.   Constitutional:       Appearance: Normal appearance.   HENT:      Head: Normocephalic and atraumatic.      Nose: Nose normal.      Mouth/Throat:      Mouth: Mucous membranes are moist.      Pharynx: Oropharynx is clear.   Eyes:      Extraocular Movements: Extraocular movements intact.      Conjunctiva/sclera: Conjunctivae normal.   Cardiovascular:      Rate and Rhythm: Normal rate and regular rhythm.      Pulses: Normal pulses.           Carotid pulses are 2+ on the right side and 2+ on the left side.       Radial pulses are 2+ on the right side and 2+ on the left side.        Dorsalis pedis pulses are 2+ on the right side and 2+ on the left side.        Posterior tibial pulses are 2+ on the right side and 2+ on the left side.      Heart sounds: Normal heart sounds.      Comments:    Spider telangectasia:       RLE:  None      LLE: none   Varicosities:           RLE: none      LLE: none   Corona phlebectatica:      RLE:  None        LLE:  None   Cording:         RLE:  None     LLE: None   Pulmonary:      Effort: Pulmonary effort is normal.      Breath sounds: Normal breath sounds.   Musculoskeletal:         General: Normal range of motion.      Cervical back: Normal range of motion and neck supple.      Right lower leg: No edema.      Left lower leg: No edema.   Skin:     General: Skin is warm and dry.      Capillary Refill: Capillary refill takes less than 2 seconds.   Neurological:      General: No focal deficit present.      Mental Status: He is alert and oriented to person, place, and time. Mental status is at baseline.   Psychiatric:         Mood and Affect: Mood normal.         Behavior: Behavior normal.      Latest Reference Range & Units Most Recent   D-Dimer 0.00 - 0.50 ug/mL (FEU) 6.94  "(H)  6/28/23 18:35   (H): Data is abnormally high    Lupus Anticoag Interp  LUPUS ANTICOAGULANT REFLEXIVE PANEL  Collected: 06/28/23 2005   Result status: Final   Resulting lab: [facility]   Value: See Note   Comment: Lupus anticoagulant not detected.       Latest Reference Range & Units Most Recent   Protein C Functional 83 - 168 % 129  6/28/23 20:05   Protein S-Functional 66 - 143 % 74  6/28/23 20:05   Antithrombin III Ag Immuno 82 - 136 % 96  6/28/23 20:57   Antithrombin III, Functional 76 - 128 % 108  6/28/23 20:57   Thrombin Time 14.7 - 19.5 sec Not Applicable  6/28/23 20:05   V Leiden Factor  Negative  6/28/23 20:05           No results found for: \"LIPOPROTA\"   No results found for: \"APOB\"    Lab Results   Component Value Date    PROTHROMBTM 14.2 06/28/2023    INR 1.11 06/28/2023         Lab Results   Component Value Date    SODIUM 140 06/28/2023    POTASSIUM 3.8 06/28/2023    CHLORIDE 105 06/28/2023    CO2 22 06/28/2023    GLUCOSE 103 (H) 06/28/2023    BUN 14 06/28/2023    CREATININE 0.95 06/28/2023        Lab Results   Component Value Date    WBC 7.3 06/28/2023    RBC 5.04 06/28/2023    HEMOGLOBIN 15.0 06/28/2023    HEMATOCRIT 44.2 06/28/2023    MCV 87.7 06/28/2023    MCH 29.8 06/28/2023    MCHC 33.9 06/28/2023    MPV 9.4 06/28/2023        VASCULAR IMAGING:    CTPA 6/28/23  1.  Positive for pulmonary embolism located within right lower lobe segmental and subsegmental branches and left segmental branches   2.  No evidence for right heart strain   3.  Bilateral lower lobe pulmonary infarct    LLE venous 6/29/23   DVT in the LEFT mid femoral vein extending into the calf with possible    unstable clot present.          Medical Decision Making:  Today's Assessment / Status / Plan:     1. Other acute pulmonary embolism without acute cor pulmonale (HCC)  CBC WITHOUT DIFFERENTIAL    Basic Metabolic Panel    ANTICARDIOLIPIN AB IGG,IGM,IGA    BETA -2 GLYCOPROTEIN I AB JENNIFER    FACTOR II DNA ANALYSIS    PHOSPHATIDYLSERINE IG " "     2. Acute deep vein thrombosis (DVT) of left femoral vein (HCC)        3. Chronic anticoagulation  CBC WITHOUT DIFFERENTIAL    Basic Metabolic Panel      4. Pulmonary infarction (HCC)          PATIENT TYPE: Primary Prevention    Etiology of Established CVD if Present:     1) VTE disease   6/2023 - apparently travel-associated acute RLL seg PE with bilat infarctions associated with acute, extensive LLE fem to calf vv DVT  - currently 100% sx-free   2019 - Had episodes of SOB in after travel and thought was \"panic attack\" - reports felt very similar to this current episode of PE but no diagnostics or definitive dx completed at that time - that being said, based upon similar symptomatology we would have to this into account as potentially another prior event   Thrombophilia/hypercoag evaluation:  partial negative, complete remainder prior to next visit   - at initial visit, gave education guide on newly dx VTE  Plan:  - no imaging surveillance needed at this time  - update additional hypercoag labs prior to next visit   - antithrombotic plan as noted below     ANTITHROMBOTIC THERAPY:  Date of initiation: 6/2023  HAS-BLED bleeding risk calc ([email or web address]): 0 pts, 0.9%, low risk   Factors to consider for indefinite OAC: Life-threatening or very extensive proximal DVT  and Male sex , possibly recurrent episode  Extended travel could be considered minor transient factor, louann since he had limited mobility during his flight just prior to current VTE event   Last CBC, BMP: reviewed above   Expected duration: reviewed standard of care as per Chest 2021 guidelines is 3-6 months minimum on full dose OAC.  After review of risks/benefits/alternatives, through shared decision-making, louann in light the potential of being a 2nd event and extensive VTE burden, we will use EINSTEIN-CHOICE protocol - continue 6mo full dose OAC (12/2023), then reduced dose x 12mo (through 12/2024).  We could consider sooner cessation should all " thrombophilia w/u neg and feeling stable  - any additional VTE events would trigger need for indefinitely therapy   Antithrombotic therapy plan:  - continue xarelto 20mg daily for now   - will call in xarelto 10mg daily in 12/2023 (reminder placed) and contact him to transition   - stressed strict adherence to tx and avoid early termination due to increased risk for recurrent VTE  - check CBC, BMP (CMP if any underlying liver disease), and monitor CrCl as needed Q6mo while on DOAC  - counseled on signs and symptoms of acute VTE that require seeking prompt attention in the ED to include shortness of breath, chest pain, pain with deep inhalation, acute leg swelling and/or pain in calf or leg   - elevate legs as much as possible, use compression stockings/socks if directed by your provider  - Avoid hormonal therapies including estrogen or testosterone-containing meds, or raloxifene or tamoxifene (commonly used for osteoporosis)  - Avoid sedentary periods  - continue complete avoidance of tobacco products  - if having any invasive procedure,please make sure the doctor knows of your history of blood clots and current anticoagulation status  - avoid Aspirin and anti-inflammatories (eg. Advil, ibuprofen, Aleve, naproxen, etc) while anticoagulated   - avoid activities that risk major head injuries to reduce risk of head injury and brain bleeds  - recommended to see your PCP to discuss if you need age-appropriate cancer screenings as a small % of blood clots may be caused by an underlying malignancy  - if any bleeding lasting 30min without stopping, please seek care with your PCP, urgent care, or ED  - reversal agents for most blood thinners are now available and used if you have major bleeding    LIPID MANAGEMENT:   Qualifies for Statin Therapy Based on 2018 ACC/AHA Guidelines: no  The ASCVD Risk score (Sabino DK, et al., 2019) failed to calculate.  Major ASCVD events: None  High-risk conditions: None   Risk-enhancers:  N/A  Currently on Statin: No  Tx goals: LDL-C <100 (consider non-HDL-C <130, apoB <90)  At goal? N/A  Plan:   - reinforced ongoing TLC measures as noted   - defer lab surveillance to PCP   Meds: none at this time     BLOOD PRESSURE CONTROL   ACC/AHA (2017) goal <130/80  Home BP at goal: yes  Office BP at goal:  yes   Plan:   - continue healthy diet, activity, weight mgmt   Monitoring:   - routine clinic-based BP measurements at least once annually   Medications: no meds indicated at this time      GLYCEMIC STATUS:  Normal    LIFESTYLE INTERVENTIONS:    SMOKING:    reports that he has never smoked. He has never used smokeless tobacco.   - continued complete avoidance of all tobacco products     PHYSICAL ACTIVITY: continue healthy activity to improve CV fitness.  In general, targeting >150min/week of moderate-level activity or as much as tolerated in light of functional status and co-morbidities     WEIGHT MANAGEMENT AND NUTRITION: Mediterranean style dietary approach       OTHER: none     Instructed to follow-up with PCP for remainder of adult medical needs: yes  We will partner with other providers in the management of established vascular disease and cardiometabolic risk factors.    Studies to Be Obtained: none    Labs to Be Obtained: as noted above     Follow up in: 6 months, sooner orxy Tan M.D.  Vascular Medicine Clinic   [facility]   [phone]

## 2023-11-03 ENCOUNTER — APPOINTMENT (OUTPATIENT)
Dept: VASCULAR LAB | Facility: MEDICAL CENTER | Age: 38
End: 2023-11-03
Payer: COMMERCIAL

## 2023-12-01 ENCOUNTER — DOCUMENTATION (OUTPATIENT)
Dept: VASCULAR LAB | Facility: MEDICAL CENTER | Age: 38
End: 2023-12-01
Payer: COMMERCIAL

## 2023-12-01 ENCOUNTER — TELEPHONE (OUTPATIENT)
Dept: VASCULAR LAB | Facility: MEDICAL CENTER | Age: 38
End: 2023-12-01
Payer: COMMERCIAL

## 2023-12-01 DIAGNOSIS — Z79.01 CHRONIC ANTICOAGULATION: ICD-10-CM

## 2023-12-01 DIAGNOSIS — I26.99 OTHER ACUTE PULMONARY EMBOLISM WITHOUT ACUTE COR PULMONALE (HCC): ICD-10-CM

## 2023-12-01 NOTE — TELEPHONE ENCOUNTER
Received New start PA request via [employer]  for XARELTO 10MG TABLETS. (Quantity:90, Day Supply:90)     Insurance: [employer] [facility]  Member ID:  [id number]  BIN: [id number]  PCN: [id number]  Group: [id number]     Ran Test claim via [employer] [facility] medication Pays for a $0 30-OR-90DS copay. Will outreach to patient to offer specialty pharmacy services and or release to preferred pharmacy    EVELINA Beach, PhT  Pharmacy Liaison (Rx Coordinator)  P: [phone]  12/1/2023 10:20 AM

## 2023-12-01 NOTE — PROGRESS NOTES
Left message for pt to relay below message from Dr. Tan:    Jeremias Tan M.D.  [facility]  Let pt know that I have sent in new Rx for xarelto 10mg to replace the 20mg as we had discussed at last visit.     Let patient know to call back if he has any questions/concerns or if he is unable to get the medication. Direct clinic line given on vm.       Bernabe Loza, Medical Assistant   [facility]   Ph: [phone]  Fx: [phone]

## 2024-04-23 ENCOUNTER — APPOINTMENT (OUTPATIENT)
Dept: VASCULAR LAB | Facility: MEDICAL CENTER | Age: 39
End: 2024-04-23
Attending: FAMILY MEDICINE
Payer: COMMERCIAL

## 2024-12-22 DIAGNOSIS — I26.99 OTHER ACUTE PULMONARY EMBOLISM WITHOUT ACUTE COR PULMONALE (HCC): ICD-10-CM

## 2024-12-22 DIAGNOSIS — Z79.01 CHRONIC ANTICOAGULATION: ICD-10-CM
